# Patient Record
Sex: MALE | Race: WHITE
[De-identification: names, ages, dates, MRNs, and addresses within clinical notes are randomized per-mention and may not be internally consistent; named-entity substitution may affect disease eponyms.]

---

## 2021-03-08 PROBLEM — Z00.00 ENCOUNTER FOR PREVENTIVE HEALTH EXAMINATION: Status: ACTIVE | Noted: 2021-03-08

## 2022-06-24 ENCOUNTER — NON-APPOINTMENT (OUTPATIENT)
Age: 62
End: 2022-06-24

## 2024-02-14 ENCOUNTER — APPOINTMENT (OUTPATIENT)
Age: 64
End: 2024-02-14
Payer: COMMERCIAL

## 2024-02-14 VITALS
SYSTOLIC BLOOD PRESSURE: 130 MMHG | BODY MASS INDEX: 25.73 KG/M2 | WEIGHT: 190 LBS | DIASTOLIC BLOOD PRESSURE: 72 MMHG | HEIGHT: 72 IN

## 2024-02-14 DIAGNOSIS — Z12.11 ENCOUNTER FOR SCREENING FOR MALIGNANT NEOPLASM OF COLON: ICD-10-CM

## 2024-02-14 DIAGNOSIS — E78.5 HYPERLIPIDEMIA, UNSPECIFIED: ICD-10-CM

## 2024-02-14 DIAGNOSIS — K86.2 CYST OF PANCREAS: ICD-10-CM

## 2024-02-14 DIAGNOSIS — Z86.79 PERSONAL HISTORY OF OTHER DISEASES OF THE CIRCULATORY SYSTEM: ICD-10-CM

## 2024-02-14 PROCEDURE — 99204 OFFICE O/P NEW MOD 45 MIN: CPT

## 2024-02-15 NOTE — ASSESSMENT
[FreeTextEntry1] : Plan: Discussed at length benign finding of "sausage like mass like pancreas". Since pt has never had episode of acute pancreatitis, unlikely to be autoimmune pancreatitis as suggested on imaging. Will repeat with MRCP to get better ready, if 3MM cyst no need to repeat again for one year. Pt agreeable. Also recommend colonoscopy since pt has hx of adenomatous polyps. Risks versus benefits as well as instructions reviewed, pt agrees to planned procedure. All questions answered. Seen and discussed with Dr. Mock.

## 2024-02-15 NOTE — ADDENDUM
[FreeTextEntry1] : I was present with the NP during the history and exam. I examined the patient and discussed the case with the NP and agree with the findings and plan as documented in the NP's note. Briefly, 64 year old man being followed by imaging for small panc cyst. Reat this year shows sausage like/mass like pancreas. There is no actually mass lesion of the pancreas and patient has no history of pancreatitis. Unclear why it is being read this way. Will re-order MRI/MRCP at [facility] and have Dr. Acevedo review. Patient also in need of a colonoscpy, will book.

## 2024-02-15 NOTE — PHYSICAL EXAM
[Alert] : alert [Healthy Appearing] : healthy appearing [Sclera] : the sclera and conjunctiva were normal [Hearing Threshold Finger Rub Not [place]] : hearing was normal [Normal Appearance] : the appearance of the neck was normal [No Respiratory Distress] : no respiratory distress [Auscultation Breath Sounds / Voice Sounds] : lungs were clear to auscultation bilaterally [Heart Rate And Rhythm] : heart rate was normal and rhythm regular [Bowel Sounds] : normal bowel sounds [Abdomen Tenderness] : non-tender [Abdomen Soft] : soft [Abnormal Walk] : normal gait [Normal Color / Pigmentation] : normal skin color and pigmentation [Oriented To Time, Place, And Person] : oriented to person, place, and time [] : no rash [Skin Lesions] : no skin lesions [No Focal Deficits] : no focal deficits [Motor Exam] : the motor exam was normal

## 2024-02-15 NOTE — HISTORY OF PRESENT ILLNESS
[FreeTextEntry1] : Robby Jacobs is a 64 year old male presenting today for initial evaluation of pancreatic cyst. 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 On imaging this year, noted "mass like pancreas appearance." Pt denies ever experiencing acute pancreatitis, has never been hospitalized. Does admit to consuming alcohol socially, but not a daily drinker. Of note, last colonoscopy was in 2017 had an adenomatous polyp. Denies any issues with bowel habits including bleeding such as melena or hematochezia. Denies upper GI symptoms such as GERD, nausea, vomiting, dysphagia, unintentional weight loss.  No post prandial symptoms.  No abdominal pain. Good appetite. No jaundice.

## 2024-02-15 NOTE — RESULTS/DATA
[TextEntry] : reviewed the MRI reports from [facility] in the portal and the scanned documents. 3 mm cyst one year ago and now with same small cyst but mass like appearance of entire pancreas

## 2024-02-27 ENCOUNTER — OUTPATIENT (OUTPATIENT)
Dept: OUTPATIENT SERVICES | Facility: HOSPITAL | Age: 64
LOS: 1 days | End: 2024-02-27
Payer: COMMERCIAL

## 2024-02-27 ENCOUNTER — APPOINTMENT (OUTPATIENT)
Dept: MRI IMAGING | Facility: CLINIC | Age: 64
End: 2024-02-27
Payer: COMMERCIAL

## 2024-02-27 DIAGNOSIS — K86.2 CYST OF PANCREAS: ICD-10-CM

## 2024-02-27 PROCEDURE — 74183 MRI ABD W/O CNTR FLWD CNTR: CPT | Mod: 26

## 2024-02-27 PROCEDURE — A9585: CPT

## 2024-02-27 PROCEDURE — 74183 MRI ABD W/O CNTR FLWD CNTR: CPT

## 2024-03-11 ENCOUNTER — APPOINTMENT (OUTPATIENT)
Dept: GASTROENTEROLOGY | Facility: HOSPITAL | Age: 64
End: 2024-03-11
Payer: COMMERCIAL

## 2024-03-11 ENCOUNTER — OUTPATIENT (OUTPATIENT)
Dept: OUTPATIENT SERVICES | Facility: HOSPITAL | Age: 64
LOS: 1 days | Discharge: HOME | End: 2024-03-11

## 2024-03-11 VITALS
SYSTOLIC BLOOD PRESSURE: 128 MMHG | TEMPERATURE: 98 F | RESPIRATION RATE: 16 BRPM | WEIGHT: 190.04 LBS | HEIGHT: 72 IN | HEART RATE: 80 BPM | OXYGEN SATURATION: 100 % | DIASTOLIC BLOOD PRESSURE: 82 MMHG

## 2024-03-11 DIAGNOSIS — Z12.11 ENCOUNTER FOR SCREENING FOR MALIGNANT NEOPLASM OF COLON: ICD-10-CM

## 2024-03-11 PROCEDURE — 45378 DIAGNOSTIC COLONOSCOPY: CPT | Mod: 33

## 2024-03-11 RX ORDER — OLMESARTAN MEDOXOMIL 5 MG/1
1 TABLET, FILM COATED ORAL
Refills: 0 | DISCHARGE

## 2024-03-15 DIAGNOSIS — Z12.11 ENCOUNTER FOR SCREENING FOR MALIGNANT NEOPLASM OF COLON: ICD-10-CM

## 2024-03-15 DIAGNOSIS — E78.5 HYPERLIPIDEMIA, UNSPECIFIED: ICD-10-CM

## 2024-03-15 DIAGNOSIS — K57.30 DIVERTICULOSIS OF LARGE INTESTINE WITHOUT PERFORATION OR ABSCESS WITHOUT BLEEDING: ICD-10-CM

## 2024-03-15 DIAGNOSIS — K64.8 OTHER HEMORRHOIDS: ICD-10-CM

## 2024-03-15 DIAGNOSIS — I10 ESSENTIAL (PRIMARY) HYPERTENSION: ICD-10-CM

## 2024-04-04 ENCOUNTER — APPOINTMENT (OUTPATIENT)
Age: 64
End: 2024-04-04